# Patient Record
Sex: FEMALE | Race: WHITE | NOT HISPANIC OR LATINO | URBAN - METROPOLITAN AREA
[De-identification: names, ages, dates, MRNs, and addresses within clinical notes are randomized per-mention and may not be internally consistent; named-entity substitution may affect disease eponyms.]

---

## 2017-12-19 ENCOUNTER — FOLLOW UP ESTABLISHED (OUTPATIENT)
Dept: URBAN - METROPOLITAN AREA CLINIC 44 | Facility: CLINIC | Age: 67
End: 2017-12-19
Payer: COMMERCIAL

## 2017-12-19 DIAGNOSIS — H40.1131 PRIMARY OPEN-ANGLE GLAUCOMA, BILATERAL, MILD STAGE: Primary | ICD-10-CM

## 2017-12-19 PROCEDURE — 92012 INTRM OPH EXAM EST PATIENT: CPT | Performed by: OPHTHALMOLOGY

## 2017-12-19 PROCEDURE — 92083 EXTENDED VISUAL FIELD XM: CPT | Performed by: OPHTHALMOLOGY

## 2017-12-19 RX ORDER — LATANOPROST 50 UG/ML
0.005 % SOLUTION OPHTHALMIC
Qty: 3 | Refills: 3 | Status: INACTIVE
Start: 2017-12-19 | End: 2019-01-15

## 2017-12-19 RX ORDER — TIMOLOL MALEATE 5 MG/ML
0.5 % SOLUTION/ DROPS OPHTHALMIC
Qty: 3 | Refills: 3 | Status: INACTIVE
Start: 2017-12-19 | End: 2019-02-01

## 2017-12-19 ASSESSMENT — INTRAOCULAR PRESSURE
OD: 16
OS: 15

## 2019-02-01 ENCOUNTER — FOLLOW UP ESTABLISHED (OUTPATIENT)
Dept: URBAN - METROPOLITAN AREA CLINIC 44 | Facility: CLINIC | Age: 69
End: 2019-02-01
Payer: MEDICARE

## 2019-02-01 PROCEDURE — 92014 COMPRE OPH EXAM EST PT 1/>: CPT | Performed by: OPTOMETRIST

## 2019-02-01 PROCEDURE — 92133 CPTRZD OPH DX IMG PST SGM ON: CPT | Performed by: OPTOMETRIST

## 2019-02-01 RX ORDER — LATANOPROST 50 UG/ML
0.005 % SOLUTION OPHTHALMIC
Qty: 3 | Refills: 3 | Status: INACTIVE
Start: 2019-02-01 | End: 2020-06-03

## 2019-02-01 RX ORDER — TIMOLOL MALEATE 5 MG/ML
0.5 % SOLUTION/ DROPS OPHTHALMIC
Qty: 3 | Refills: 3 | Status: INACTIVE
Start: 2019-02-01 | End: 2021-12-03

## 2019-02-01 ASSESSMENT — KERATOMETRY
OS: 43.25
OD: 42.75

## 2019-02-01 ASSESSMENT — VISUAL ACUITY
OD: 20/20
OS: 20/20

## 2019-02-01 ASSESSMENT — INTRAOCULAR PRESSURE
OD: 16
OS: 16

## 2019-10-07 ENCOUNTER — FOLLOW UP ESTABLISHED (OUTPATIENT)
Dept: URBAN - METROPOLITAN AREA CLINIC 44 | Facility: CLINIC | Age: 69
End: 2019-10-07
Payer: MEDICARE

## 2019-10-07 PROCEDURE — 92133 CPTRZD OPH DX IMG PST SGM ON: CPT | Performed by: OPTOMETRIST

## 2019-10-07 PROCEDURE — 92014 COMPRE OPH EXAM EST PT 1/>: CPT | Performed by: OPTOMETRIST

## 2019-10-07 PROCEDURE — 92083 EXTENDED VISUAL FIELD XM: CPT | Performed by: OPTOMETRIST

## 2019-10-07 ASSESSMENT — VISUAL ACUITY
OD: 20/20
OS: 20/25

## 2019-10-07 ASSESSMENT — INTRAOCULAR PRESSURE
OD: 15
OS: 15

## 2020-06-26 ENCOUNTER — FOLLOW UP ESTABLISHED (OUTPATIENT)
Dept: URBAN - METROPOLITAN AREA CLINIC 44 | Facility: CLINIC | Age: 70
End: 2020-06-26
Payer: MEDICARE

## 2020-06-26 PROCEDURE — 92133 CPTRZD OPH DX IMG PST SGM ON: CPT | Performed by: OPTOMETRIST

## 2020-06-26 PROCEDURE — 92083 EXTENDED VISUAL FIELD XM: CPT | Performed by: OPTOMETRIST

## 2020-06-26 PROCEDURE — 92014 COMPRE OPH EXAM EST PT 1/>: CPT | Performed by: OPTOMETRIST

## 2020-06-26 ASSESSMENT — KERATOMETRY
OS: 43.75
OD: 42.75

## 2020-06-26 ASSESSMENT — INTRAOCULAR PRESSURE
OD: 14
OS: 15

## 2020-06-26 ASSESSMENT — VISUAL ACUITY
OD: 20/25
OS: 20/20

## 2020-12-29 ENCOUNTER — FOLLOW UP ESTABLISHED (OUTPATIENT)
Dept: URBAN - METROPOLITAN AREA CLINIC 44 | Facility: CLINIC | Age: 70
End: 2020-12-29
Payer: MEDICARE

## 2020-12-29 PROCEDURE — 92134 CPTRZ OPH DX IMG PST SGM RTA: CPT | Performed by: OPTOMETRIST

## 2020-12-29 PROCEDURE — 92133 CPTRZD OPH DX IMG PST SGM ON: CPT | Performed by: OPTOMETRIST

## 2020-12-29 PROCEDURE — 92083 EXTENDED VISUAL FIELD XM: CPT | Performed by: OPTOMETRIST

## 2020-12-29 PROCEDURE — 92014 COMPRE OPH EXAM EST PT 1/>: CPT | Performed by: OPTOMETRIST

## 2020-12-29 ASSESSMENT — INTRAOCULAR PRESSURE
OS: 14
OD: 14

## 2020-12-29 ASSESSMENT — KERATOMETRY
OD: 42.75
OS: 42.88

## 2021-12-03 ENCOUNTER — OFFICE VISIT (OUTPATIENT)
Dept: URBAN - METROPOLITAN AREA CLINIC 44 | Facility: CLINIC | Age: 71
End: 2021-12-03
Payer: MEDICARE

## 2021-12-03 DIAGNOSIS — H26.492 OTHER SECONDARY CATARACT, LEFT EYE: ICD-10-CM

## 2021-12-03 DIAGNOSIS — H04.123 DRY EYE SYNDROME OF BILATERAL LACRIMAL GLANDS: ICD-10-CM

## 2021-12-03 PROCEDURE — 92083 EXTENDED VISUAL FIELD XM: CPT | Performed by: OPTOMETRIST

## 2021-12-03 PROCEDURE — 99214 OFFICE O/P EST MOD 30 MIN: CPT | Performed by: OPTOMETRIST

## 2021-12-03 PROCEDURE — 92133 CPTRZD OPH DX IMG PST SGM ON: CPT | Performed by: OPTOMETRIST

## 2021-12-03 PROCEDURE — 92134 CPTRZ OPH DX IMG PST SGM RTA: CPT | Performed by: OPTOMETRIST

## 2021-12-03 ASSESSMENT — KERATOMETRY
OS: 43.38
OD: 43.00

## 2021-12-03 ASSESSMENT — VISUAL ACUITY
OS: 20/50
OD: 20/30

## 2021-12-03 ASSESSMENT — INTRAOCULAR PRESSURE
OS: 12
OD: 12

## 2021-12-03 NOTE — IMPRESSION/PLAN
Impression: Primary open-angle glaucoma, mild stage, bilateral Plan: OCT RNFL wnl OU. Visual field unreliable today repeat next visit. IOP wnl 12/12; Pachs avg.  Continue Latanoprost QHS OU. monitor

## 2021-12-03 NOTE — IMPRESSION/PLAN
Impression: Other secondary cataract, left eye: H26.492.  Plan: Yag OS for bva, vision change last 6 mo need rx ck after yag

## 2022-02-25 ENCOUNTER — SURGERY (OUTPATIENT)
Dept: URBAN - METROPOLITAN AREA SURGERY 19 | Facility: SURGERY | Age: 72
End: 2022-02-25
Payer: MEDICARE

## 2022-02-25 PROCEDURE — 66821 AFTER CATARACT LASER SURGERY: CPT | Performed by: OPHTHALMOLOGY

## 2022-03-11 ENCOUNTER — POST-OPERATIVE VISIT (OUTPATIENT)
Dept: URBAN - METROPOLITAN AREA CLINIC 44 | Facility: CLINIC | Age: 72
End: 2022-03-11
Payer: MEDICARE

## 2022-03-11 DIAGNOSIS — Z48.810 ENCOUNTER FOR SURGICAL AFTERCARE FOLLOWING SURGERY ON A SENSE ORGAN: Primary | ICD-10-CM

## 2022-03-11 PROCEDURE — 99024 POSTOP FOLLOW-UP VISIT: CPT | Performed by: OPTOMETRIST

## 2022-03-11 ASSESSMENT — INTRAOCULAR PRESSURE
OS: 12
OD: 13

## 2022-03-11 ASSESSMENT — KERATOMETRY
OS: 2171.63
OD: 42.88

## 2022-03-11 NOTE — IMPRESSION/PLAN
Impression: S/P YAG Capsulotomy (Yttrium Aluminum [place]) OS - 14 Days. Encounter for surgical aftercare following surgery on a sense organ  Z48.810.  Plan: Pt happy with readers

## 2022-11-03 ENCOUNTER — OFFICE VISIT (OUTPATIENT)
Dept: URBAN - METROPOLITAN AREA CLINIC 44 | Facility: CLINIC | Age: 72
End: 2022-11-03
Payer: MEDICARE

## 2022-11-03 DIAGNOSIS — H40.1131 PRIMARY OPEN-ANGLE GLAUCOMA, MILD STAGE, BILATERAL: Primary | ICD-10-CM

## 2022-11-03 PROCEDURE — 92134 CPTRZ OPH DX IMG PST SGM RTA: CPT | Performed by: OPTOMETRIST

## 2022-11-03 PROCEDURE — 99214 OFFICE O/P EST MOD 30 MIN: CPT | Performed by: OPTOMETRIST

## 2022-11-03 PROCEDURE — 92133 CPTRZD OPH DX IMG PST SGM ON: CPT | Performed by: OPTOMETRIST

## 2022-11-03 PROCEDURE — 92083 EXTENDED VISUAL FIELD XM: CPT | Performed by: OPTOMETRIST

## 2022-11-03 RX ORDER — PILOCARPINE HYDROCHLORIDE 12.5 MG/ML
1.25 % SOLUTION/ DROPS OPHTHALMIC
Qty: 5 | Refills: 0 | Status: ACTIVE
Start: 2022-11-03

## 2022-11-03 RX ORDER — LATANOPROST 50 UG/ML
0.005 % SOLUTION OPHTHALMIC
Qty: 7.5 | Refills: 3 | Status: ACTIVE
Start: 2022-11-03

## 2022-11-03 ASSESSMENT — VISUAL ACUITY
OS: 20/20
OD: 20/30

## 2022-11-03 ASSESSMENT — KERATOMETRY
OS: 42.88
OD: 42.00

## 2022-11-03 ASSESSMENT — INTRAOCULAR PRESSURE
OS: 18
OD: 18

## 2022-11-03 NOTE — IMPRESSION/PLAN
Impression: Primary open-angle glaucoma, mild stage, bilateral Plan: THE PT HAS POAG AND NAG OU        GONIO ndGndRndAndDndEnd:nd nd2nd OU S/P SLT OD 6/26/12 // S/P SLT OS 7/10/12 S/P LPI OU (2009) PT DENIES LUNG DZ
PT DENIES ALLERGY TO SULFA TARGET IOP IS LOW TEENS OR LESS OU
RECOMMEND 1. CONTINUE LATANOPROST OU QPM.  OK rx Vuity qam ou

## 2023-05-09 ENCOUNTER — OFFICE VISIT (OUTPATIENT)
Dept: URBAN - METROPOLITAN AREA CLINIC 44 | Facility: CLINIC | Age: 73
End: 2023-05-09
Payer: MEDICARE

## 2023-05-09 DIAGNOSIS — H40.1131 PRIMARY OPEN-ANGLE GLAUCOMA, BILATERAL, MILD STAGE: Primary | ICD-10-CM

## 2023-05-09 DIAGNOSIS — H04.123 DRY EYE SYNDROME OF BILATERAL LACRIMAL GLANDS: ICD-10-CM

## 2023-05-09 DIAGNOSIS — H43.313 VITREOUS MEMBRANES AND STRANDS, BILATERAL: ICD-10-CM

## 2023-05-09 PROCEDURE — 92133 CPTRZD OPH DX IMG PST SGM ON: CPT | Performed by: OPTOMETRIST

## 2023-05-09 PROCEDURE — 99214 OFFICE O/P EST MOD 30 MIN: CPT | Performed by: OPTOMETRIST

## 2023-05-09 PROCEDURE — 92134 CPTRZ OPH DX IMG PST SGM RTA: CPT | Performed by: OPTOMETRIST

## 2023-05-09 PROCEDURE — 92083 EXTENDED VISUAL FIELD XM: CPT | Performed by: OPTOMETRIST

## 2023-05-09 RX ORDER — LATANOPROST 50 UG/ML
0.005 % SOLUTION OPHTHALMIC
Qty: 90 | Refills: 4 | Status: ACTIVE
Start: 2023-05-09

## 2023-05-09 ASSESSMENT — VISUAL ACUITY
OS: 20/25
OD: 20/25

## 2023-05-09 ASSESSMENT — KERATOMETRY
OD: 42.25
OS: 43.00

## 2023-05-09 ASSESSMENT — INTRAOCULAR PRESSURE
OS: 13
OD: 15

## 2023-05-09 NOTE — IMPRESSION/PLAN
Impression: Vitreous membranes and strands, bilateral: H43.313. Plan: MAC OCT wnl, no retinal pathology. Discussed signs and symptoms of PVD/floaters. Discussed signs and symptoms of retinal detachment. Call ASAP if signs and symptoms of RD.  Consult with Dr. Teresa Mott.

## 2023-05-15 ENCOUNTER — OFFICE VISIT (OUTPATIENT)
Dept: URBAN - METROPOLITAN AREA CLINIC 44 | Facility: CLINIC | Age: 73
End: 2023-05-15
Payer: MEDICARE

## 2023-05-15 DIAGNOSIS — H43.313 VITREOUS MEMBRANES AND STRANDS, BILATERAL: Primary | ICD-10-CM

## 2023-05-15 DIAGNOSIS — H40.1131 PRIMARY OPEN-ANGLE GLAUCOMA, BILATERAL, MILD STAGE: ICD-10-CM

## 2023-05-15 PROCEDURE — 92134 CPTRZ OPH DX IMG PST SGM RTA: CPT | Performed by: OPHTHALMOLOGY

## 2023-05-15 PROCEDURE — 99214 OFFICE O/P EST MOD 30 MIN: CPT | Performed by: OPHTHALMOLOGY

## 2023-05-15 ASSESSMENT — INTRAOCULAR PRESSURE
OD: 15
OS: 13

## 2023-05-15 NOTE — IMPRESSION/PLAN
Impression: Vitreous membranes and strands, bilateral: H43.313. Plan: no retinal breaks detected. minimally symptomatic at this time. s/sx RD and tears reviewed. call if any new symptoms develop. 

PRN retina

## 2024-06-14 ENCOUNTER — OFFICE VISIT (OUTPATIENT)
Dept: URBAN - METROPOLITAN AREA CLINIC 44 | Facility: CLINIC | Age: 74
End: 2024-06-14
Payer: MEDICARE

## 2024-06-14 DIAGNOSIS — H43.313 VITREOUS MEMBRANES AND STRANDS, BILATERAL: ICD-10-CM

## 2024-06-14 DIAGNOSIS — H40.1131 PRIMARY OPEN-ANGLE GLAUCOMA, BILATERAL, MILD STAGE: Primary | ICD-10-CM

## 2024-06-14 PROCEDURE — 92134 CPTRZ OPH DX IMG PST SGM RTA: CPT | Performed by: OPTOMETRIST

## 2024-06-14 PROCEDURE — 92083 EXTENDED VISUAL FIELD XM: CPT | Performed by: OPTOMETRIST

## 2024-06-14 PROCEDURE — 92014 COMPRE OPH EXAM EST PT 1/>: CPT | Performed by: OPTOMETRIST

## 2024-06-14 PROCEDURE — 92133 CPTRZD OPH DX IMG PST SGM ON: CPT | Performed by: OPTOMETRIST

## 2024-06-14 ASSESSMENT — VISUAL ACUITY
OD: 20/25
OS: 20/30

## 2024-06-14 ASSESSMENT — INTRAOCULAR PRESSURE
OD: 13
OS: 12

## 2024-06-14 ASSESSMENT — KERATOMETRY
OS: 43.00
OD: 42.50

## 2025-02-14 ENCOUNTER — OFFICE VISIT (OUTPATIENT)
Dept: URBAN - METROPOLITAN AREA CLINIC 44 | Facility: CLINIC | Age: 75
End: 2025-02-14
Payer: MEDICARE

## 2025-02-14 DIAGNOSIS — H04.123 DRY EYE SYNDROME OF BILATERAL LACRIMAL GLANDS: Primary | ICD-10-CM

## 2025-02-14 DIAGNOSIS — H40.1131 PRIMARY OPEN-ANGLE GLAUCOMA, BILATERAL, MILD STAGE: ICD-10-CM

## 2025-02-14 DIAGNOSIS — H43.313 VITREOUS MEMBRANES AND STRANDS, BILATERAL: ICD-10-CM

## 2025-02-14 PROCEDURE — 99214 OFFICE O/P EST MOD 30 MIN: CPT | Performed by: OPTOMETRIST

## 2025-02-14 PROCEDURE — 92133 CPTRZD OPH DX IMG PST SGM ON: CPT | Performed by: OPTOMETRIST

## 2025-02-14 PROCEDURE — 92134 CPTRZ OPH DX IMG PST SGM RTA: CPT | Performed by: OPTOMETRIST

## 2025-02-14 RX ORDER — LATANOPROST 50 UG/ML
0.005 % SOLUTION OPHTHALMIC
Qty: 90 | Refills: 4 | Status: ACTIVE
Start: 2025-02-14

## 2025-02-14 ASSESSMENT — VISUAL ACUITY
OS: 20/25
OD: 20/30

## 2025-02-14 ASSESSMENT — KERATOMETRY
OS: 43.13
OD: 23.63

## 2025-02-14 ASSESSMENT — INTRAOCULAR PRESSURE
OD: 14
OS: 14